# Patient Record
Sex: FEMALE | Race: WHITE | URBAN - METROPOLITAN AREA
[De-identification: names, ages, dates, MRNs, and addresses within clinical notes are randomized per-mention and may not be internally consistent; named-entity substitution may affect disease eponyms.]

---

## 2020-08-11 ENCOUNTER — OFFICE VISIT (OUTPATIENT)
Dept: CARDIOLOGY CLINIC | Age: 80
End: 2020-08-11
Payer: MEDICARE

## 2020-08-11 ENCOUNTER — TELEPHONE (OUTPATIENT)
Dept: CARDIOLOGY CLINIC | Age: 80
End: 2020-08-11

## 2020-08-11 VITALS
HEIGHT: 64 IN | WEIGHT: 168.8 LBS | SYSTOLIC BLOOD PRESSURE: 122 MMHG | DIASTOLIC BLOOD PRESSURE: 72 MMHG | HEART RATE: 91 BPM | BODY MASS INDEX: 28.82 KG/M2

## 2020-08-11 PROBLEM — I48.91 ATRIAL FIBRILLATION (HCC): Status: ACTIVE | Noted: 2020-08-11

## 2020-08-11 PROBLEM — R00.2 PALPITATIONS: Status: ACTIVE | Noted: 2020-08-11

## 2020-08-11 PROCEDURE — G8427 DOCREV CUR MEDS BY ELIG CLIN: HCPCS | Performed by: INTERNAL MEDICINE

## 2020-08-11 PROCEDURE — 1123F ACP DISCUSS/DSCN MKR DOCD: CPT | Performed by: INTERNAL MEDICINE

## 2020-08-11 PROCEDURE — 99204 OFFICE O/P NEW MOD 45 MIN: CPT | Performed by: INTERNAL MEDICINE

## 2020-08-11 PROCEDURE — 4040F PNEUMOC VAC/ADMIN/RCVD: CPT | Performed by: INTERNAL MEDICINE

## 2020-08-11 PROCEDURE — G8400 PT W/DXA NO RESULTS DOC: HCPCS | Performed by: INTERNAL MEDICINE

## 2020-08-11 PROCEDURE — 93000 ELECTROCARDIOGRAM COMPLETE: CPT | Performed by: INTERNAL MEDICINE

## 2020-08-11 PROCEDURE — 1036F TOBACCO NON-USER: CPT | Performed by: INTERNAL MEDICINE

## 2020-08-11 PROCEDURE — G8417 CALC BMI ABV UP PARAM F/U: HCPCS | Performed by: INTERNAL MEDICINE

## 2020-08-11 PROCEDURE — 1090F PRES/ABSN URINE INCON ASSESS: CPT | Performed by: INTERNAL MEDICINE

## 2020-08-11 RX ORDER — ATENOLOL 50 MG/1
75 TABLET ORAL 2 TIMES DAILY
COMMUNITY
Start: 2020-07-27 | End: 2021-09-10 | Stop reason: SDUPTHER

## 2020-08-11 RX ORDER — ASPIRIN 81 MG/1
81 TABLET, CHEWABLE ORAL DAILY
COMMUNITY
End: 2020-08-11

## 2020-08-11 RX ORDER — LOVASTATIN 20 MG/1
20 TABLET ORAL DAILY
COMMUNITY
Start: 2020-07-27 | End: 2022-03-16 | Stop reason: SDUPTHER

## 2020-08-11 NOTE — LETTER
Hernan Daniels  1940  [id number]    Have you had any Chest Pain - No      Have you had any Shortness of Breath - No      Have you had any dizziness - No      Have you had any palpitations - Yes  If Yes DO EKG - Do you feel your heart racing  How long does it last - minutes     Is the patient on any of the following medications - none  If Yes DO EKG    Do you have any edema -no    Do you have a surgery or procedure scheduled in the near future - no    Ask patient if they [facility] if they are not already signed [facility]    Check to see if we have an E-MAIL on file for the patient    Check medication list thoroughly!!!  BE SURE TO ASK PATIENT IF THEY NEED MEDICATION REFILLS

## 2020-08-11 NOTE — PATIENT INSTRUCTIONS
Newly recognized sustained A-fib. No significant symptoms. Will check Echo & stress test ( for rate control assessment ). Start Eliquis 5 mg BID. Arrange TE-Guided cardioversion. Stop ASA  Office F/U after the procedures. Please hold on to these instructions the  will call you within 1-9 business days when we receive authorization from your insurance. Echocardiogram    WHAT TO EXPECT:   ? This test will take approximately 45 minutes. ? It is an ultrasound of the heart. ? It can look at the valves and chambers inside the heart   ? There is no special instructions for this test.     If you are unable to keep this appointment, please notify us 24 hours prior to test at [phone]. Please hold on to these instructions the  will call you within 1-9 business days when we receive authorization from your insurance. Treadmill Stress test    WHAT TO EXPECT:     The exercise stress test is a test used to provide information about how the heart responds to exertion. It involves walking on a treadmill at increasing levels of difficulty, while electrocardiogram, heart rate, and blood pressure are monitored. ? This test will take approximately 1 hours: Please arrive at the office 5-10 min before the scheduled testing time. ? Once you are taken back to the stress lab you will be asked to read and sign a consent before proceeding with the test. At this time feel free to ask any question that you may have as the procedure is explained to you.   ? You will be attached to the EKG monitoring equipment, your blood pressure will be taken, and you will begin walking on the treadmill. The treadmill starts off slowly and every 3 minutes the treadmill speeds up and the elevation increases. The average person usually walks for a period of 6-8min. PREPARATION FOR TEST:    ? Eat a light meal such as juice and toast at least 2 hours prior to the procedure.    ? AVOID CAFFEINE 24 HOURS PRIOR TO THE TEST: Including coffee, Tea, Yesica and other soft drinks even those labeled  caffeine free or decaffeinated. ? Please wear loose comfortable clothing and comfortable walking shoes. Please wear a short sleeved shirt. ? Please shower or bath and do not apply powder or lotion to the skin prior to testing, as the electrodes will adhere better giving us a clearer visual EKG recording.    ? DO NOT TAKE BETA-BLOCKERS 24 HOURS PRIOR TO TESTING SUCH AS:\"Tenormin (atenolol)

## 2020-08-11 NOTE — PROGRESS NOTES
CARDIAC CONSULT NOTE       Reason for consultation: Yony Gentile is a 78 y.o. female who had concerns including Palpitations. .    Chief Complaint   Patient presents with    Palpitations       Referring physician:  No primary care provider on file. Primary care physician:  No primary care provider on file. History of Present Illness:   79 Y/O white female referred for evaluation, as she was noted to be in newly diagnosed atrial fibrillation. Other wise patient does not have any H/O significant medical problems. She hargrove have H/O SVT for which she has been treated with Atenolol & Dyslipidemia. No H/O Syncope, no C/O chest pain. Has HARGROVE with 2 flight of steps. She does not have H/O HTN,DM,CHF,CAD / PAD & previous stroke. But patient has been on Atenolol & HTN could be masked. That akes her CHADS score to be 3.     has no past medical history on file. H/O SVT & Dyslipidemia     has no past surgical history on file. reports that she has quit smoking. She has never used smokeless tobacco.     As Reviewed family history includes Arrhythmia in her maternal aunt, maternal grandmother, mother, and paternal aunt; Heart Surgery in her father. Review of Systems:   1. Constitutional: No Fever or Weight Loss  2. Eyes: No Decreased Vision  3. ENT: No Headaches, Hearing Loss or Vertigo  4. Cardiovascular: No chest pain, dyspnea on exertion, +palpitations no loss of consciousness  5. Respiratory: No cough or wheezing    6. Gastrointestinal: No abdominal pain, appetite loss, blood in stools, constipation, diarrhea or heartburn  7. Genitourinary: No dysuria, trouble voiding, or hematuria  8. Musculoskeletal:  No gait disturbance, weakness or joint complaints  9. Integumentary: No rash or pruritis  10. Neurological: No TIA or stroke symptoms  11. Psychiatric: No anxiety or depression  12. Endocrine: No malaise, fatigue or temperature intolerance  13.  Hematologic/Lymphatic: No bleeding problems, blood clots or swollen lymph nodes  14. Allergic/Immunologic: No nasal congestion or hives    Physical Examination:    /72   Pulse 91   Ht 5' 4\" (1.626 m)   Wt 168 lb 12.8 oz (76.6 kg)   BMI 28.97 kg/m²    Wt Readings from Last 3 Encounters:   08/11/20 168 lb 12.8 oz (76.6 kg)     Body mass index is 28.97 kg/m². General Appearance:  Non-obese/Well Nourished  1. Skin: It is warm & dry. No rashes noted. 2. Eyes: No conjunctival Pallor seen. No jaundice noted. 3. HEENT: atraumatic / normocephalic. EOMI. Neck is supple there is no elevation of JVD. No thyromegaly  4. Respiratory:  · Resp Assessment: Normal  · Resp Auscultation: Normal breath sounds without dullness  5. Cardiovascular:  · Auscultation: Normal  · Carotid Arteries: Normal  · Abdominal Aorta: Normal   · Pedal Pulses: 2+ and equal   6. Abdomen:  · No masses or tenderness  · Liver/Spleen: No Abnormalities Noted, no organomegaly. 7. Musculoskeletal: No joint deformities. No muscle wasting  8. Extremities:  ·  No Cyanosis or Clubbing. No significant edema   9. Rectal / genital: deferred. 10. Neurological/Psychiatric:  · Oriented to time, place, and person  · Non-anxious    No results found for: CKTOTAL, CKMB, CKMBINDEX, TROPONINT  BNP:  No results found for: BNP  PT/INR:  No results found for: PTINR  No results found for: LABA1C  No results found for: CHOL, TRIG, HDL, LDLCALC, LDLDIRECT, CHOLHDLRATIO  No results found for: ALT, AST  BMP:  No results found for: NA, K, CL, CO2, BUN  CMP:  No results found for: NA, K, CL, CO2, BUN, PROT, ALB  TSH:  No results found for: TSH, TSHHS    Echo: not done  Stress Cardiolyte: not done  EKG: atrial fibrillation, rate 91    Assessment:   Patient Active Problem List   Diagnosis Code    Palpitations R00.2    [facility] I48.91       QUALITY MEASURES REVIEWED:  1.CAD:Patient is taking anti platelet agent:Yes  2. DYSLIPIDEMIA: Patient is on cholesterol lowering medication:Yes  3. Beta-Blocker therapy for CAD, if prior Myocardial Infarction:Yes  4. Atrial fibrillation & anticoagulation therapy No  5. Discussed weight management strategies. Recommendations:   Newly recognized sustained A-fib. No significant symptoms. Will check Echo & stress test ( for rate control assessment ). Start Eliquis 5 mg BID. Arrange TE-Guided cardioversion. Stop ASA  Office F/U after the procedures.        Leona Moncada MD, 8/11/2020 3:21 PM

## 2020-08-11 NOTE — TELEPHONE ENCOUNTER
Pt here in office & educated on MARIAN/Cardioversion for Dx: AFIB, scheduled for TBD @ TBD, w/arrival @ TBD, @ [facility]; risks explained; & consents signed. Pre-admission orders given to pt. COVID testing @ [facility] @ [facility] Instructions given to pt to:  NPO after midnight night before procedure; Call hospital @ [phone] to pre-register; May take rest of morning meds. am of procedure; & pt voiced understanding. Copies of consent, pre-testing orders, & info. sheet scanned into chart. Patient scheduled for echo and stress test.  Patient to call in 2-3 weeks with decision of procedure as she needs to discuss with family and think over.

## 2020-08-12 ENCOUNTER — TELEPHONE (OUTPATIENT)
Dept: CARDIOLOGY CLINIC | Age: 80
End: 2020-08-12

## 2020-08-12 NOTE — TELEPHONE ENCOUNTER
Pt called to cx treadmill for today at 4 pm. Did not want to reschedule at this time. was not sent to

## 2020-08-19 ENCOUNTER — TELEPHONE (OUTPATIENT)
Dept: CARDIOLOGY CLINIC | Age: 80
End: 2020-08-19

## 2020-08-20 NOTE — TELEPHONE ENCOUNTER
Patient advised that she may qualify for Patient assistance through [employer] as she has no Medicare part D coverage. Patient assistance forms placed at the  with 30 day free card. She voiced understanding.

## 2020-08-26 ENCOUNTER — PROCEDURE VISIT (OUTPATIENT)
Dept: CARDIOLOGY CLINIC | Age: 80
End: 2020-08-26
Payer: MEDICARE

## 2020-08-26 LAB
LV EF: 58 %
LVEF MODALITY: NORMAL

## 2020-08-26 PROCEDURE — 93015 CV STRESS TEST SUPVJ I&R: CPT | Performed by: INTERNAL MEDICINE

## 2020-08-26 PROCEDURE — 93306 TTE W/DOPPLER COMPLETE: CPT | Performed by: INTERNAL MEDICINE

## 2020-09-01 ENCOUNTER — TELEPHONE (OUTPATIENT)
Dept: CARDIOLOGY CLINIC | Age: 80
End: 2020-09-01

## 2020-09-01 NOTE — TELEPHONE ENCOUNTER
Patient returned call for results and voiced understanding. Patient is scheduled for f/u OV with Alexis on 9/22 when she would like to discuss the MARIAN/Cardioversion procedure. She was originally here on 8/11 and refused to schedule and states she has discussed with her family but is not ready to schedule the procedure. Patient states maybe after the office visit and talking with Dr. Helena Carrington again she may be ready.

## 2020-09-01 NOTE — TELEPHONE ENCOUNTER
Called patient to advise of results. LM on  for patient to return call. Patient was seen on 8/11 and did not want to scheduled for MARIAN/Cardioversion. She cancelled GXT the first time. Patient was wanting to discuss procedure with family before making decision. Need to know if procedure needs scheduled. She has f/u OV with Alexis on 9/22. Conclusion:Fair exercise tolerance. 5 METs work load. Excessive HR response to exertion suggests A-fib HR is not well controlled. Physiological BP response to exercise. ETT non diagnostic due to baseline abnormalities. Left ventricular function is normal, EF is estimated at 55-60%. Diastolic dysfunction could not be evaluated due to arrhythmia. No regional wall motion abnormalities were detected. Bi atrial enlargemnt. Mitral annular calcification is present. Moderate mitral, tricuspid and mild aortic regurgitation is present. No evidence of pericardial effusion.

## 2020-09-22 ENCOUNTER — OFFICE VISIT (OUTPATIENT)
Dept: CARDIOLOGY CLINIC | Age: 80
End: 2020-09-22
Payer: MEDICARE

## 2020-09-22 VITALS
TEMPERATURE: 97 F | SYSTOLIC BLOOD PRESSURE: 120 MMHG | HEART RATE: 84 BPM | HEIGHT: 64 IN | BODY MASS INDEX: 27.86 KG/M2 | DIASTOLIC BLOOD PRESSURE: 86 MMHG | WEIGHT: 163.2 LBS

## 2020-09-22 PROBLEM — E78.5 DYSLIPIDEMIA: Status: ACTIVE | Noted: 2020-09-22

## 2020-09-22 PROBLEM — I10 ESSENTIAL HYPERTENSION: Status: ACTIVE | Noted: 2020-09-22

## 2020-09-22 PROBLEM — I38 VHD (VALVULAR HEART DISEASE): Status: ACTIVE | Noted: 2020-09-22

## 2020-09-22 PROCEDURE — 1036F TOBACCO NON-USER: CPT | Performed by: INTERNAL MEDICINE

## 2020-09-22 PROCEDURE — 4040F PNEUMOC VAC/ADMIN/RCVD: CPT | Performed by: INTERNAL MEDICINE

## 2020-09-22 PROCEDURE — G8417 CALC BMI ABV UP PARAM F/U: HCPCS | Performed by: INTERNAL MEDICINE

## 2020-09-22 PROCEDURE — 99213 OFFICE O/P EST LOW 20 MIN: CPT | Performed by: INTERNAL MEDICINE

## 2020-09-22 PROCEDURE — G8427 DOCREV CUR MEDS BY ELIG CLIN: HCPCS | Performed by: INTERNAL MEDICINE

## 2020-09-22 PROCEDURE — 1123F ACP DISCUSS/DSCN MKR DOCD: CPT | Performed by: INTERNAL MEDICINE

## 2020-09-22 PROCEDURE — 1090F PRES/ABSN URINE INCON ASSESS: CPT | Performed by: INTERNAL MEDICINE

## 2020-09-22 PROCEDURE — G8400 PT W/DXA NO RESULTS DOC: HCPCS | Performed by: INTERNAL MEDICINE

## 2020-09-22 RX ORDER — ATENOLOL 25 MG/1
25 TABLET ORAL 2 TIMES DAILY
Qty: 180 TABLET | Refills: 3 | Status: SHIPPED | OUTPATIENT
Start: 2020-09-22 | End: 2021-09-14 | Stop reason: SDUPTHER

## 2020-09-22 RX ORDER — ATENOLOL 50 MG/1
50 TABLET ORAL 2 TIMES DAILY
Qty: 180 TABLET | Refills: 3 | Status: SHIPPED | OUTPATIENT
Start: 2020-09-22 | End: 2021-03-15

## 2020-09-22 NOTE — PROGRESS NOTES
[id number] Score for Atrial Fibrillation Stroke Risk   Risk   Factors  Component Value   C CHF No 0   H HTN No 0   A2 Age >= 76 Yes,  [de-identified] y.o.) 2   D DM No 0   S2 Prior Stroke/TIA No 0   V Vascular Disease No 0   A Age 74-69 No,  [de-identified] y.o.) 0   Sc Sex female 1    [id number]  Score  3   Score last updated 9/22/20 2:98 PM EDT    Click here for a link to the UpToDate guideline \"Atrial Fibrillation: Anticoagulation therapy to prevent embolization    Disclaimer: Risk Score calculation is dependent on accuracy of patient problem list and past encounter diagnosis.

## 2020-09-22 NOTE — PROGRESS NOTES
OFFICE PROGRESS NOTES      Arturo Womack is a [de-identified] y.o. female who has    CHIEF COMPLAINT AS FOLLOWS:  CHEST PAIN: Patient denies any C/O chest pains at this time. SOB: No C/O SOB at this time. LEG EDEMA: No leg edema   PALPITATIONS: Denies any C/O Palpitations   DIZZINESS: No C/O Dizziness   SYNCOPE: None   OTHER:                                     HPI: Patient is here for F/U on her A-fib, ? HTN & Dyslipidemia problems. She does not have any complaints at this time. Reynaldo Nickerson has the following history recorded in care path:  Patient Active Problem List    Diagnosis Date Noted    Palpitations 08/11/2020    Atrial fibrillation ([address] 08/11/2020     Current Outpatient Medications   Medication Sig Dispense Refill    atenolol (TENORMIN) 25 MG tablet Take 1 tablet by mouth 2 times daily 180 tablet 3    atenolol (TENORMIN) 50 MG tablet Take 1 tablet by mouth 2 times daily 180 tablet 3    atenolol (TENORMIN) 50 MG tablet Take 75 mg by mouth 2 times daily       lovastatin (MEVACOR) 20 MG tablet Take 20 mg by mouth daily      apixaban (ELIQUIS) 5 MG TABS tablet Take 1 tablet by mouth 2 times daily 60 tablet 5     No current facility-administered medications for this visit. Allergies: Penicillins  Past Medical History:   Diagnosis Date    History of echocardiogram 08/26/2020    EF 33-52%, Diastolic dysfunction not evaluated due to arrhythmia, no regional wall motion abnormalities, bi atrial enlargement, mitral annular calcification present, Mod MR, TR, Mild AR, No pericardial effusion     History of exercise stress test 08/26/2020    Treadmill, Excessive HR response to exertion suggests A-fib HR is not well controlled. Physiological BP response to exercise. ETT non diagnostic due to baseline abnormalities. History reviewed. No pertinent surgical history.    As reviewed   Family History   Problem Relation Age of Onset    Cardiovascular - Normal S1 and S2 without obvious murmur or gallop. Extremities - No cyanosis, clubbing, or significant edema. Pulmonary - No respiratory distress. No wheezes or rales. Abdomen - No masses, tenderness, or organomegaly. Musculoskeletal - No significant edema. No joint deformities. No muscle wasting. Neurologic - Cranial nerves II through XII are grossly intact. There were no gross focal neurologic abnormalities. Lab Review   No results found for: CKTOTAL, CKMB, CKMBINDEX, TROPONINT  BNP:  No results found for: BNP  PT/INR:  No results found for: INR  No results found for: LABA1C  No results found for: WBC, HCT, MCV, PLT  No results found for: CHOL, TRIG, HDL, LDLCALC, LDLDIRECT, CHOLHDLRATIO  No results found for: ALT, AST  BMP:  No results found for: NA, K, CL, CO2, BUN, CREATININE  CMP: No results found for: NA, K, CL, CO2, BUN, PROT, ALB  TSH:  No results found for: TSH, TSHHS    QUALITY MEASURES REVIEWED:  1.CAD:Patient is taking anti platelet agent:No  2. DYSLIPIDEMIA: Patient is on cholesterol lowering medication:Yes  3. Beta-Blocker therapy for CAD, if prior Myocardial Infarction:Yes  4. Atrial fibrillation & anticoagulation therapy Yes  5. Discussed weight management strategies. Impression:    No diagnosis found. Patient Active Problem List   Diagnosis Code    Palpitations R00.2    Atrial fibrillation (H[facility]) I48.91     [id number] Score for Atrial Fibrillation Stroke Risk   Risk   Factors  Component Value   C CHF No 0   H HTN Yes 1   A2 Age >= 76 Yes,  [de-identified] y.o.) 2   D DM No 0   S2 Prior Stroke/TIA No 0   V Vascular Disease No 0   A Age 74-69 No,  [de-identified] y.o.) 0   Sc Sex female 1    [id number]  Score  4   Score last updated 9/22/20 4:47 PM EDT    Assessment & Plan:    CAD:No  HTN:well controlled on current medical regimen, see list above.               - changes in  treatment:   no   CARDIOMYOPATHY: None known   CONGESTIVE HEART FAILURE: NO KNOWN HISTORY.      VHD: Moderate MR / TR  DYSLIPIDEMIA: Patient's profile is not available. ARRHYTHMIAS: known                                Patient has H/O Atrial fibrillation                                She is rate controlled & on anticoagulation. OTHER RELEVANT DIAGNOSIS:as noted in patient's active problem list:  TESTS ORDERED: None this visit. Patient still wants to think about cardioversion. All previously ordered tests reviewed. MEDICATIONS: CPM for now. Office f/u in one month after cardioversion. Primary/secondary prevention is the goal by aggressive risk modification, healthy and therapeutic life style changes for cardiovascular risk reduction. Various goals are discussed and multiple questions answered.

## 2020-09-22 NOTE — PATIENT INSTRUCTIONS
CAD:No  HTN:well controlled on current medical regimen, see list above. - changes in  treatment:   no   CARDIOMYOPATHY: None known   CONGESTIVE HEART FAILURE: NO KNOWN HISTORY.      VHD: Moderate MR / TR  DYSLIPIDEMIA: Patient's profile is not available. ARRHYTHMIAS: known                                Patient has H/O Atrial fibrillation                                She is rate controlled & on anticoagulation. OTHER RELEVANT DIAGNOSIS:as noted in patient's active problem list:  TESTS ORDERED: None this visit. Patient still wants to think about cardioversion. All previously ordered tests reviewed. MEDICATIONS: CPM for now. Office f/u in one month after cardioversion. Primary/secondary prevention is the goal by aggressive risk modification, healthy and therapeutic life style changes for cardiovascular risk reduction. Various goals are discussed and multiple questions answered.

## 2021-03-15 ENCOUNTER — OFFICE VISIT (OUTPATIENT)
Dept: CARDIOLOGY CLINIC | Age: 81
End: 2021-03-15
Payer: MEDICARE

## 2021-03-15 VITALS
HEIGHT: 64 IN | DIASTOLIC BLOOD PRESSURE: 80 MMHG | HEART RATE: 84 BPM | WEIGHT: 169.8 LBS | BODY MASS INDEX: 28.99 KG/M2 | SYSTOLIC BLOOD PRESSURE: 128 MMHG

## 2021-03-15 DIAGNOSIS — I38 VHD (VALVULAR HEART DISEASE): ICD-10-CM

## 2021-03-15 DIAGNOSIS — I10 ESSENTIAL HYPERTENSION: ICD-10-CM

## 2021-03-15 DIAGNOSIS — I48.0 PAROXYSMAL ATRIAL FIBRILLATION (HCC): Primary | ICD-10-CM

## 2021-03-15 DIAGNOSIS — E78.5 DYSLIPIDEMIA: ICD-10-CM

## 2021-03-15 PROCEDURE — 99214 OFFICE O/P EST MOD 30 MIN: CPT | Performed by: INTERNAL MEDICINE

## 2021-03-15 NOTE — LETTER
[address]  [address] [id number]  Phone: [phone]  Fax: [phone]    Darryn Haro MD        March 15, 2021     Blaine Quinn, [address] [place] [address]    Patient: Cassie South  MR Number: [id number]  YOB: 1940  Date of Visit: 3/15/2021    Dear Dr. Blaine Quinn:    Thank you for the request for consultation for Cassie South to me for the evaluation of VHD/A-Fib. Below are the relevant portions of my assessment and plan of care. If you have questions, please do not hesitate to call me. I look forward to following [place] along with you.     Sincerely,        Darryn Haro MD

## 2021-03-15 NOTE — PROGRESS NOTES
OFFICE PROGRESS NOTES      Thompson Ann is a [de-identified] y.o. female who has    CHIEF COMPLAINT AS FOLLOWS:  CHEST PAIN: Patient denies any C/O chest pains at this time. SOB: No C/O SOB at this time. LEG EDEMA: No leg edema   PALPITATIONS: Denies any C/O Palpitations   DIZZINESS: No C/O Dizziness   SYNCOPE: None   OTHER:                                     HPI: Patient is here for F/U on his A-fib- Arrhythmia, ? HTN & Dyslipidemia problems. Arrhythmia: Patient has known Hx of Supraventricular / Ventricular arrhythmia in the past.  HTN: Patient has known Hx of essential HTN. Has been treated with guideline recommended medical / physical/ diet therapy as stated below. He does not have any new complaints at this time. Current Outpatient Medications   Medication Sig Dispense Refill    apixaban (ELIQUIS) 5 MG TABS tablet Take 1 tablet by mouth 2 times daily 60 tablet 5    atenolol (TENORMIN) 25 MG tablet Take 1 tablet by mouth 2 times daily 180 tablet 3    atenolol (TENORMIN) 50 MG tablet Take 75 mg by mouth 2 times daily       lovastatin (MEVACOR) 20 MG tablet Take 20 mg by mouth daily       No current facility-administered medications for this visit. Allergies: Penicillins  Review of Systems:    Constitutional: Negative for diaphoresis and fatigue  Respiratory: Negative for shortness of breath  Cardiovascular: Negative for chest pain, dyspnea on exertion, claudication, edema, irregular heartbeat, murmur, palpitations or shortness of breath  Musculoskeletal: Negative for muscle pain, muscular weakness, negative for pain in arm and leg or swelling in foot and leg    Objective:  /80   Pulse 84   Ht 5' 4\" (1.626 m)   Wt 169 lb 12.8 oz (77 kg)   BMI 29.15 kg/m²   Wt Readings from Last 3 Encounters:   03/15/21 169 lb 12.8 oz (77 kg)   09/22/20 163 lb 3.2 oz (74 kg)   08/11/20 168 lb 12.8 oz (76.6 kg)     Body mass index is 29.15 kg/m².   GENERAL - Alert, oriented, pleasant, in no apparent distress. EYES: No jaundice, no conjunctival pallor. Neck - Supple. No jugular venous distention noted. No carotid bruits. Cardiovascular - Normal S1 and S2 without obvious murmur or gallop. Extremities - No cyanosis, clubbing, or significant edema. Pulmonary - No respiratory distress. No wheezes or rales. Lab Review   No results found for: TROPONINT  No results found for: BNP, PROBNP  No results found for: INR  No results found for: LABA1C  No results found for: WBC, HCT, MCV, PLT  No results found for: CHOL, TRIG, HDL, LDLCALC, LDLDIRECT, CHOLHDLRATIO  No results found for: ALT, AST  BMP:  No results found for: NA, K, CL, CO2, BUN, CREATININE  CMP: No results found for: NA, K, CL, CO2, BUN, CREATININE, PROT, ALB  No results found for: TSH, TSHHS    ECHO 8/2020   Left ventricular function is normal, EF is estimated at 55-60%. Diastolic dysfunction could not be evaluated due to arrhythmia. No regional wall motion abnormalities were detected. Bi atrial enlargemnt. Mitral annular calcification is present. Moderate mitral, tricuspid and mild aortic regurgitation is present. No evidence of pericardial effusion. STRESS TEST: 8/2020   Conclusion:Fair exercise tolerance. 5 METs work load. Excessive HR response to exertion suggests A-fib HR is not well controlled. Physiological BP response to exercise. ETT non diagnostic due to baseline abnormalities. QUALITY MEASURES REVIEWED:  1.CAD:Patient is taking anti platelet agent:No  Patient does not have Hx of documented CAD  2. DYSLIPIDEMIA: Patient is on cholesterol lowering medication:Yes . 3.Beta-Blocker therapy for CAD, if prior Myocardial Infarction:Yes   4. Counselled regarding smoking cessation. No   Patient does not Smoke. 5.Anticoagulation therapy (for A.Fib) Yes   Does Not have A.Fib.  6.Discussed weight management strategies.       Assessment & Plan:    Primary / Secondary prevention is the goal by aggressive risk modification, healthy and therapeutic life style changes for cardiovascular risk reduction. Various goals are discussed and multiple questions answered. CAD:None known  HTN:well controlled on current medical regimen, see list above. - changes in  treatment:   no   CARDIOMYOPATHY: None known   CONGESTIVE HEART FAILURE: NO KNOWN HISTORY.      VHD: Moderate MR / TR  DYSLIPIDEMIA: Patient's profile is not available. ARRHYTHMIAS: known                                Patient has H/O Atrial fibrillation                                She is rate controlled & on anticoagulation. OTHER RELEVANT DIAGNOSIS:as noted in patient's active problem list:  TESTS ORDERED: None this visit. Patient still wants to think about cardioversion. All previously ordered tests reviewed. MEDICATIONS: CPM   Office f/u in one year.

## 2021-03-15 NOTE — PATIENT INSTRUCTIONS
CAD:None known  HTN:well controlled on current medical regimen, see list above. - changes in  treatment:   no   CARDIOMYOPATHY: None known   CONGESTIVE HEART FAILURE: NO KNOWN HISTORY.      VHD: Moderate MR / TR  DYSLIPIDEMIA: Patient's profile is not available. ARRHYTHMIAS: known                                Patient has H/O Atrial fibrillation                                She is rate controlled & on anticoagulation. OTHER RELEVANT DIAGNOSIS:as noted in patient's active problem list:  TESTS ORDERED: None this visit. Patient still wants to think about cardioversion. All previously ordered tests reviewed. MEDICATIONS: CPM   Office f/u in one year.

## 2021-03-15 NOTE — PROGRESS NOTES
[id number] Score for Atrial Fibrillation Stroke Risk   Risk   Factors  Component Value   C CHF No 0   H HTN Yes 1   A2 Age >= 76 Yes,  [de-identified] y.o.) 2   D DM No 0   S2 Prior Stroke/TIA No 0   V Vascular Disease No 0   A Age 74-69 No,  [de-identified] y.o.) 0   Sc Sex female 1    [id number]  Score  4   Score last updated 3/15/21 6:48 PM EDT    Click here for a link to the UpToDate guideline \"Atrial Fibrillation: Anticoagulation therapy to prevent embolization    Disclaimer: Risk Score calculation is dependent on accuracy of patient problem list and past encounter diagnosis.

## 2021-09-14 RX ORDER — ATENOLOL 50 MG/1
TABLET ORAL
Qty: 180 TABLET | OUTPATIENT
Start: 2021-09-14

## 2021-09-14 RX ORDER — ATENOLOL 25 MG/1
TABLET ORAL
Qty: 180 TABLET | Refills: 3 | OUTPATIENT
Start: 2021-09-14

## 2021-09-14 RX ORDER — ATENOLOL 50 MG/1
75 TABLET ORAL 2 TIMES DAILY
Qty: 90 TABLET | Refills: 1 | Status: SHIPPED | OUTPATIENT
Start: 2021-09-14 | End: 2021-12-28 | Stop reason: SDUPTHER

## 2021-09-14 RX ORDER — ATENOLOL 25 MG/1
25 TABLET ORAL 2 TIMES DAILY
Qty: 180 TABLET | Refills: 3 | Status: SHIPPED | OUTPATIENT
Start: 2021-09-14 | End: 2022-03-16 | Stop reason: SDUPTHER

## 2021-12-28 RX ORDER — ATENOLOL 50 MG/1
TABLET ORAL
Qty: 90 TABLET | Refills: 1 | Status: SHIPPED | OUTPATIENT
Start: 2021-12-28 | End: 2022-03-16 | Stop reason: SDUPTHER

## 2021-12-28 NOTE — TELEPHONE ENCOUNTER
Patient called asking  Refill for 50 mg she takes 2 times a day not 1/2 tablet its to hard to spit   She also takes a 25 mg as well  90 days appt 3/22

## 2022-03-16 ENCOUNTER — OFFICE VISIT (OUTPATIENT)
Dept: CARDIOLOGY CLINIC | Age: 82
End: 2022-03-16
Payer: MEDICARE

## 2022-03-16 VITALS
BODY MASS INDEX: 29.02 KG/M2 | WEIGHT: 170 LBS | SYSTOLIC BLOOD PRESSURE: 120 MMHG | HEART RATE: 75 BPM | DIASTOLIC BLOOD PRESSURE: 72 MMHG | HEIGHT: 64 IN

## 2022-03-16 DIAGNOSIS — I48.0 PAROXYSMAL ATRIAL FIBRILLATION (HCC): Primary | ICD-10-CM

## 2022-03-16 DIAGNOSIS — I10 ESSENTIAL HYPERTENSION: ICD-10-CM

## 2022-03-16 DIAGNOSIS — E78.5 DYSLIPIDEMIA: ICD-10-CM

## 2022-03-16 DIAGNOSIS — I38 VHD (VALVULAR HEART DISEASE): ICD-10-CM

## 2022-03-16 PROCEDURE — 99213 OFFICE O/P EST LOW 20 MIN: CPT | Performed by: INTERNAL MEDICINE

## 2022-03-16 RX ORDER — ATENOLOL 25 MG/1
25 TABLET ORAL 2 TIMES DAILY
Qty: 180 TABLET | Refills: 3 | Status: SHIPPED | OUTPATIENT
Start: 2022-03-16

## 2022-03-16 RX ORDER — ATENOLOL 50 MG/1
TABLET ORAL
Qty: 180 TABLET | Refills: 3 | Status: SHIPPED | OUTPATIENT
Start: 2022-03-16

## 2022-03-16 RX ORDER — LOVASTATIN 20 MG/1
20 TABLET ORAL DAILY
Qty: 90 TABLET | Refills: 3 | Status: SHIPPED | OUTPATIENT
Start: 2022-03-16

## 2022-03-16 NOTE — PATIENT INSTRUCTIONS
CORONARY ARTERY DISEASE:None known  8/2020   Conclusion:Fair exercise tolerance. 5 METs work load. Excessive HR response to exertion suggests A-fib HR is not well controlled. Physiological BP response to exercise. ETT non diagnostic due to baseline abnormalities. HTN:well controlled on current medical regimen, see list above.              - changes in  treatment:   no, on Atenolol   CARDIOMYOPATHY: None known   CONGESTIVE HEART FAILURE: NO KNOWN HISTORY.      VHD: Moderate MR / TR  ECHO 8/2020   Left ventricular function is normal, EF is estimated at 55-60%. Diastolic dysfunction could not be evaluated due to arrhythmia. No regional wall motion abnormalities were detected. Bi atrial enlargemnt. Mitral annular calcification is present. Moderate mitral, tricuspid and mild aortic regurgitation is present. No evidence of pericardial effusion. DYSLIPIDEMIA: Patient's profile is not available. Patient is on Mevacor  ARRHYTHMIAS: known                                Patient has H/O Atrial fibrillation                                She is rate controlled & on anticoagulation. takes Eliquis & atenolol  TESTS ORDERED:     PREVIOUSLY ORDERED TESTS REVIEWED & DISCUSSED WITH THE PATIENT:     I personally reviewed & interpreted, all previously ordered tests as copied above. Latest Labs are pulled in to the note with dates. Labs, specially in Reference to Lipid profile, Cardiac testing in the form of Echo ( dated: ), stress tests ( dated: ) & other relevant cardiac testing reviewed with patient & recommendations made based on assessment of the results. Discussed role of Cardiac risk factors & effects + treatment of co morbidities with patient & advised accordingly. MEDICATIONS: List of medications patient is currently taking is reviewed in detail with the patient. Discussed any side effects or problems taking the medication. Recommend Continue present management & medications as listed. AFFIRMATION: I spent at least 20 minutes of time reviewing patient's history, previous & current medical problems & all Labs + testing. This includes chart prep even prior to the vosit. Various goals are discussed and multiple questions answered. Relevant concelling performed. Office follow up in one year.

## 2022-03-16 NOTE — LETTER
[facility]  [address] [place] [address]  Phone: [phone]  Fax: [phone]    Janeen Jacques MD    March 16, 2022     Debi Johnson, [address] [place] [address]    Patient: Zuleima Dwyer   MR Number: [id number]   YOB: 1940   Date of Visit: 3/16/2022       Dear Debi Johnson:    Thank you for referring Zuleima Dwyer to me for evaluation/treatment. Below are the relevant portions of my assessment and plan of care. If you have questions, please do not hesitate to call me. I look forward to following Christopher Welch along with you.     Sincerely,      Janeen Jacques MD

## 2022-03-16 NOTE — PROGRESS NOTES
OFFICE PROGRESS NOTES      Denis Villafuerte is a 80 y.o. female who has    CHIEF COMPLAINT AS FOLLOWS:  CHEST PAIN: Patient denies any C/O chest pains at this time.      SOB: No C/O SOB at this time.               LEG EDEMA: No leg edema   PALPITATIONS: Denies any C/O Palpitations   DIZZINESS: No C/O Dizziness   SYNCOPE: None   OTHER/ ADDITIONAL COMPLAINTS:                                     HPI: Patient is here for F/U on her A-fib Arrhythmia, HTN & Dyslipidemia problems. Arrhythmia: Patient has known A-fib. HTN: Patient has known essential HTN. Has been treated with guideline recommended medical / physical/ diet therapy as stated below. Dyslipidemia: Patient has known mixed dyslipidemia. Has been treated with guideline recommended medical / physical/ diet therapy as stated below. Current Outpatient Medications   Medication Sig Dispense Refill    apixaban (ELIQUIS) 5 MG TABS tablet Take 1 tablet by mouth 2 times daily 180 tablet 3    atenolol (TENORMIN) 25 MG tablet Take 1 tablet by mouth 2 times daily 180 tablet 3    atenolol (TENORMIN) 50 MG tablet Take one tablet  tablet 3    lovastatin (MEVACOR) 20 MG tablet Take 1 tablet by mouth daily 90 tablet 3     No current facility-administered medications for this visit.      Allergies: Penicillins  Review of Systems:    Constitutional: Negative for diaphoresis and fatigue  Respiratory: Negative for shortness of breath  Cardiovascular: Negative for chest pain, dyspnea on exertion, claudication, edema, irregular heartbeat, murmur, palpitations or shortness of breath  Musculoskeletal: Negative for muscle pain, muscular weakness, negative for pain in arm and leg or swelling in foot and leg    Objective:  /72   Pulse 75   Ht 5' 4\" (1.626 m)   Wt 170 lb (77.1 kg)   BMI 29.18 kg/m²   Wt Readings from Last 3 Encounters:   03/16/22 170 lb (77.1 kg)   03/15/21 169 lb 12.8 oz (77 kg)   09/22/20 163 lb 3.2 oz (74 kg)     Body mass index is 29.18 kg/m².  GENERAL - Alert, oriented, pleasant, in no apparent distress. EYES: No jaundice, no conjunctival pallor. Neck - Supple. No jugular venous distention noted. No carotid bruits. Cardiovascular - Normal S1 and S2 without obvious murmur or gallop. Extremities - No cyanosis, clubbing, or significant edema. Pulmonary - No respiratory distress. No wheezes or rales. MEDICAL DECISION MAKING & DATA REVIEW:    Lab Review   No results found for: TROPONINT  No results found for: BNP, PROBNP  No results found for: INR  No results found for: LABA1C  No results found for: WBC, HCT, MCV, PLT  No results found for: CHOL, TRIG, HDL, LDLCALC, LDLDIRECT, CHOLHDLRATIO  No results found for: ALT, AST  BMP:  No results found for: NA, K, CL, CO2, BUN, CREATININE, GLU  CMP: No results found for: NA, K, CL, CO2, BUN, CREATININE, GLU, PROT, ALB, CA  No results found for: TSH, TSHHS    QUALITY MEASURES REVIEWED:  1.CAD:Patient is taking anti platelet agent:No  Patient does not have Hx of documented CAD  2. DYSLIPIDEMIA: Patient is on cholesterol lowering medication:Yes   3. Beta-Blocker therapy for CAD, if prior Myocardial Infarction:Yes   4. Counselled regarding smoking cessation. No   Patient does not Smoke. 5.Anticoagulation therapy (for A.Fib) Yes  6. Discussed weight management strategies. Assessment & Plan:  Primary / Secondary prevention is the goal by aggressive risk modification, healthy and therapeutic life style changes for cardiovascular risk reduction. CORONARY ARTERY DISEASE:None known  8/2020   Conclusion:Fair exercise tolerance. 5 METs work load. Excessive HR response to exertion suggests A-fib HR is not well controlled. Physiological BP response to exercise. ETT non diagnostic due to baseline abnormalities.   HTN:well controlled on current medical regimen, see list above.              - changes in  treatment:   no, on Atenolol   CARDIOMYOPATHY: None known   CONGESTIVE HEART FAILURE: NO KNOWN HISTORY.      VHD: Moderate MR / TR  ECHO 8/2020   Left ventricular function is normal, EF is estimated at 55-60%. Diastolic dysfunction could not be evaluated due to arrhythmia. No regional wall motion abnormalities were detected. Bi atrial enlargemnt. Mitral annular calcification is present. Moderate mitral, tricuspid and mild aortic regurgitation is present. No evidence of pericardial effusion. DYSLIPIDEMIA: Patient's profile is not available. Patient is on Mevacor  ARRHYTHMIAS: known                                Patient has H/O Atrial fibrillation                                She is rate controlled & on anticoagulation. takes Eliquis & atenolol  TESTS ORDERED:     PREVIOUSLY ORDERED TESTS REVIEWED & DISCUSSED WITH THE PATIENT:     I personally reviewed & interpreted, all previously ordered tests as copied above. Latest Labs are pulled in to the note with dates. Labs, specially in Reference to Lipid profile, Cardiac testing in the form of Echo ( dated: ), stress tests ( dated: ) & other relevant cardiac testing reviewed with patient & recommendations made based on assessment of the results. Discussed role of Cardiac risk factors & effects + treatment of co morbidities with patient & advised accordingly. MEDICATIONS: List of medications patient is currently taking is reviewed in detail with the patient. Discussed any side effects or problems taking the medication. Recommend Continue present management & medications as listed. AFFIRMATION: I spent at least 20 minutes of time reviewing patient's history, previous & current medical problems & all Labs + testing. This includes chart prep even prior to the vosit. Various goals are discussed and multiple questions answered. Relevant concelling performed. Office follow up in one year.

## 2022-03-31 LAB
ALBUMIN SERPL-MCNC: 4.8 G/DL
ALP BLD-CCNC: 91 U/L
ALT SERPL-CCNC: 25 U/L
ANION GAP SERPL CALCULATED.3IONS-SCNC: NORMAL MMOL/L
AST SERPL-CCNC: 23 U/L
AVERAGE GLUCOSE: NORMAL
BASOPHILS ABSOLUTE: 0.1 /ΜL
BASOPHILS RELATIVE PERCENT: 1.2 %
BILIRUB SERPL-MCNC: 0.5 MG/DL (ref 0.1–1.4)
BUN BLDV-MCNC: 15 MG/DL
CALCIUM SERPL-MCNC: 9.3 MG/DL
CHLORIDE BLD-SCNC: 106 MMOL/L
CHOLESTEROL, TOTAL: 157 MG/DL
CHOLESTEROL/HDL RATIO: NORMAL
CO2: 25 MMOL/L
CREAT SERPL-MCNC: 1.2 MG/DL
EOSINOPHILS ABSOLUTE: 0.4 /ΜL
EOSINOPHILS RELATIVE PERCENT: 6.8 %
GFR CALCULATED: NORMAL
GLUCOSE BLD-MCNC: 99 MG/DL
HBA1C MFR BLD: 6.1 %
HCT VFR BLD CALC: 31.6 % (ref 36–46)
HDLC SERPL-MCNC: 61 MG/DL (ref 35–70)
HEMOGLOBIN: 9.8 G/DL (ref 12–16)
LDL CHOLESTEROL CALCULATED: 83 MG/DL (ref 0–160)
LYMPHOCYTES ABSOLUTE: 2.3 /ΜL
LYMPHOCYTES RELATIVE PERCENT: 39.9 %
MCH RBC QN AUTO: 24.5 PG
MCHC RBC AUTO-ENTMCNC: 31 G/DL
MCV RBC AUTO: 79 FL
MONOCYTES ABSOLUTE: 0.5 /ΜL
MONOCYTES RELATIVE PERCENT: 7.9 %
NEUTROPHILS ABSOLUTE: 2.5 /ΜL
NEUTROPHILS RELATIVE PERCENT: 43.9 %
NONHDLC SERPL-MCNC: NORMAL MG/DL
PLATELET # BLD: 259 K/ΜL
PMV BLD AUTO: ABNORMAL FL
POTASSIUM SERPL-SCNC: 4.4 MMOL/L
RBC # BLD: 4 10^6/ΜL
SODIUM BLD-SCNC: 142 MMOL/L
TOTAL PROTEIN: 6.4
TRIGL SERPL-MCNC: 66 MG/DL
TSH SERPL DL<=0.05 MIU/L-ACNC: 2.1 UIU/ML
VLDLC SERPL CALC-MCNC: 13 MG/DL
WBC # BLD: 5.7 10^3/ML

## 2022-07-26 ENCOUNTER — TELEPHONE (OUTPATIENT)
Dept: CARDIOLOGY CLINIC | Age: 82
End: 2022-07-26

## 2022-07-26 NOTE — TELEPHONE ENCOUNTER
Alexis / patient needs clearance for Alfonzo Almonte she is having EGD C Scope   Thursday .  Office to send request

## 2022-07-27 ENCOUNTER — TELEPHONE (OUTPATIENT)
Dept: CARDIOLOGY CLINIC | Age: 82
End: 2022-07-27

## 2022-07-27 NOTE — TELEPHONE ENCOUNTER
Cardiologist: Dr. Vergara Current  Surgeon: Dr. Lyndsey Wang  Surgery: Colonoscopy/EGD  Anesthesia: Propofol  Date: 7/28/2022  FAX# [phone]  Ph#     Last OV 3/16/2022 w/Alexis    CORONARY ARTERY DISEASE:None known  8/2020   Conclusion:Fair exercise tolerance. 5 METs work load. Excessive HR response to exertion suggests A-fib HR is not well controlled. Physiological BP response to exercise. ETT non diagnostic due to baseline abnormalities. HTN:well controlled on current medical regimen, see list above. - changes in  treatment:   no, on Atenolol   CARDIOMYOPATHY: None known   CONGESTIVE HEART FAILURE: NO KNOWN HISTORY.      VHD: Moderate MR / TR  ECHO 8/2020   Left ventricular function is normal, EF is estimated at 55-60%. Diastolic dysfunction could not be evaluated due to arrhythmia. No regional wall motion abnormalities were detected. Bi atrial enlargemnt. Mitral annular calcification is present. Moderate mitral, tricuspid and mild aortic regurgitation is present. No evidence of pericardial effusion. DYSLIPIDEMIA: Patient's profile is not available. Patient is on Mevacor  ARRHYTHMIAS: known                                Patient has H/O Atrial fibrillation                                She is rate controlled & on anticoagulation. takes Eliquis & atenolol  TESTS ORDERED:        Last EKG- 1/11/2020    Stress Test- 8/26/2020   Conclusion:Fair exercise tolerance. 5 METs work load. Excessive HR response to exertion suggests A-fib HR is not well controlled. Physiological BP response to exercise. ETT non diagnostic due to baseline abnormalities      Echo-8/26/2020   Left ventricular function is normal, EF is estimated at 55-60%. Diastolic dysfunction could not be evaluated due to arrhythmia. No regional wall motion abnormalities were detected. Bi atrial enlargemnt. Mitral annular calcification is present. Moderate mitral, tricuspid and mild aortic regurgitation is present.    No evidence of pericardial effusion.         Eliquis

## 2022-07-28 ENCOUNTER — OFFICE (OUTPATIENT)
Dept: URBAN - METROPOLITAN AREA PATHOLOGY 1 | Facility: PATHOLOGY | Age: 82
End: 2022-07-28
Payer: MEDICARE

## 2022-07-28 ENCOUNTER — AMBULATORY SURGICAL CENTER (OUTPATIENT)
Dept: URBAN - METROPOLITAN AREA SURGERY 7 | Facility: SURGERY | Age: 82
End: 2022-07-28

## 2022-07-28 ENCOUNTER — AMBULATORY SURGICAL CENTER (OUTPATIENT)
Dept: URBAN - METROPOLITAN AREA SURGERY 7 | Facility: SURGERY | Age: 82
End: 2022-07-28
Payer: MEDICARE

## 2022-07-28 VITALS
SYSTOLIC BLOOD PRESSURE: 134 MMHG | SYSTOLIC BLOOD PRESSURE: 116 MMHG | DIASTOLIC BLOOD PRESSURE: 66 MMHG | RESPIRATION RATE: 17 BRPM | SYSTOLIC BLOOD PRESSURE: 136 MMHG | DIASTOLIC BLOOD PRESSURE: 55 MMHG | SYSTOLIC BLOOD PRESSURE: 124 MMHG | HEART RATE: 86 BPM | HEART RATE: 86 BPM | HEART RATE: 84 BPM | OXYGEN SATURATION: 93 % | HEART RATE: 85 BPM | HEART RATE: 90 BPM | DIASTOLIC BLOOD PRESSURE: 55 MMHG | TEMPERATURE: 97.5 F | DIASTOLIC BLOOD PRESSURE: 74 MMHG | OXYGEN SATURATION: 97 % | SYSTOLIC BLOOD PRESSURE: 116 MMHG | SYSTOLIC BLOOD PRESSURE: 124 MMHG | DIASTOLIC BLOOD PRESSURE: 72 MMHG | OXYGEN SATURATION: 99 % | OXYGEN SATURATION: 93 % | SYSTOLIC BLOOD PRESSURE: 100 MMHG | RESPIRATION RATE: 19 BRPM | DIASTOLIC BLOOD PRESSURE: 65 MMHG | DIASTOLIC BLOOD PRESSURE: 84 MMHG | HEIGHT: 64 IN | RESPIRATION RATE: 7 BRPM | SYSTOLIC BLOOD PRESSURE: 118 MMHG | HEART RATE: 90 BPM | HEART RATE: 105 BPM | SYSTOLIC BLOOD PRESSURE: 151 MMHG | HEART RATE: 91 BPM | DIASTOLIC BLOOD PRESSURE: 90 MMHG | OXYGEN SATURATION: 98 % | SYSTOLIC BLOOD PRESSURE: 129 MMHG | OXYGEN SATURATION: 99 % | HEART RATE: 105 BPM | RESPIRATION RATE: 16 BRPM | DIASTOLIC BLOOD PRESSURE: 62 MMHG | RESPIRATION RATE: 20 BRPM | HEART RATE: 81 BPM | DIASTOLIC BLOOD PRESSURE: 69 MMHG | RESPIRATION RATE: 19 BRPM | RESPIRATION RATE: 17 BRPM | WEIGHT: 154 LBS | RESPIRATION RATE: 9 BRPM | OXYGEN SATURATION: 90 % | HEART RATE: 98 BPM | TEMPERATURE: 97.5 F | HEART RATE: 77 BPM | RESPIRATION RATE: 18 BRPM | HEART RATE: 91 BPM | HEART RATE: 89 BPM | DIASTOLIC BLOOD PRESSURE: 72 MMHG | SYSTOLIC BLOOD PRESSURE: 125 MMHG | RESPIRATION RATE: 9 BRPM | DIASTOLIC BLOOD PRESSURE: 59 MMHG | SYSTOLIC BLOOD PRESSURE: 151 MMHG | DIASTOLIC BLOOD PRESSURE: 70 MMHG | DIASTOLIC BLOOD PRESSURE: 71 MMHG | SYSTOLIC BLOOD PRESSURE: 153 MMHG | DIASTOLIC BLOOD PRESSURE: 62 MMHG | SYSTOLIC BLOOD PRESSURE: 118 MMHG | DIASTOLIC BLOOD PRESSURE: 90 MMHG | SYSTOLIC BLOOD PRESSURE: 153 MMHG | DIASTOLIC BLOOD PRESSURE: 66 MMHG | OXYGEN SATURATION: 96 % | OXYGEN SATURATION: 100 % | OXYGEN SATURATION: 96 % | OXYGEN SATURATION: 83 % | HEART RATE: 83 BPM | HEART RATE: 77 BPM | HEART RATE: 98 BPM | RESPIRATION RATE: 7 BRPM | HEART RATE: 81 BPM | OXYGEN SATURATION: 100 % | OXYGEN SATURATION: 98 % | HEART RATE: 85 BPM | DIASTOLIC BLOOD PRESSURE: 74 MMHG | DIASTOLIC BLOOD PRESSURE: 70 MMHG | HEART RATE: 124 BPM | SYSTOLIC BLOOD PRESSURE: 100 MMHG | RESPIRATION RATE: 18 BRPM | HEART RATE: 89 BPM | OXYGEN SATURATION: 83 % | HEIGHT: 64 IN | DIASTOLIC BLOOD PRESSURE: 69 MMHG | OXYGEN SATURATION: 97 % | HEART RATE: 83 BPM | SYSTOLIC BLOOD PRESSURE: 136 MMHG | RESPIRATION RATE: 16 BRPM | SYSTOLIC BLOOD PRESSURE: 134 MMHG | DIASTOLIC BLOOD PRESSURE: 84 MMHG | HEART RATE: 124 BPM | RESPIRATION RATE: 14 BRPM | HEART RATE: 84 BPM | WEIGHT: 154 LBS | DIASTOLIC BLOOD PRESSURE: 71 MMHG | SYSTOLIC BLOOD PRESSURE: 125 MMHG | OXYGEN SATURATION: 90 % | DIASTOLIC BLOOD PRESSURE: 65 MMHG | RESPIRATION RATE: 20 BRPM | RESPIRATION RATE: 14 BRPM | DIASTOLIC BLOOD PRESSURE: 59 MMHG | SYSTOLIC BLOOD PRESSURE: 129 MMHG

## 2022-07-28 DIAGNOSIS — K31.819 ANGIODYSPLASIA OF STOMACH AND DUODENUM WITHOUT BLEEDING: ICD-10-CM

## 2022-07-28 DIAGNOSIS — K29.30 CHRONIC SUPERFICIAL GASTRITIS WITHOUT BLEEDING: ICD-10-CM

## 2022-07-28 DIAGNOSIS — C18.2 MALIGNANT NEOPLASM OF ASCENDING COLON: ICD-10-CM

## 2022-07-28 DIAGNOSIS — D50.9 IRON DEFICIENCY ANEMIA, UNSPECIFIED: ICD-10-CM

## 2022-07-28 DIAGNOSIS — K57.30 DIVERTICULOSIS OF LARGE INTESTINE WITHOUT PERFORATION OR ABS: ICD-10-CM

## 2022-07-28 DIAGNOSIS — K44.9 DIAPHRAGMATIC HERNIA WITHOUT OBSTRUCTION OR GANGRENE: ICD-10-CM

## 2022-07-28 PROBLEM — K31.89 OTHER DISEASES OF STOMACH AND DUODENUM: Status: ACTIVE | Noted: 2022-07-28

## 2022-07-28 PROBLEM — D37.4 NEOPLASM OF UNCERTAIN BEHAVIOR OF COLON: Status: ACTIVE | Noted: 2022-07-28

## 2022-07-28 PROBLEM — K63.5 POLYP OF COLON: Status: ACTIVE | Noted: 2022-07-28

## 2022-07-28 PROBLEM — K56.609 UNSP INTESTNL OBST, UNSP AS TO PARTIAL VERSUS COMPLETE OBST: Status: ACTIVE | Noted: 2022-07-28

## 2022-07-28 PROCEDURE — 45380 COLONOSCOPY AND BIOPSY: CPT | Performed by: INTERNAL MEDICINE

## 2022-07-28 PROCEDURE — 45381 COLONOSCOPY SUBMUCOUS NJX: CPT | Performed by: INTERNAL MEDICINE

## 2022-07-28 PROCEDURE — 43239 EGD BIOPSY SINGLE/MULTIPLE: CPT | Mod: XS | Performed by: INTERNAL MEDICINE

## 2022-07-28 PROCEDURE — 88312 SPECIAL STAINS GROUP 1: CPT | Performed by: PATHOLOGY

## 2022-07-28 PROCEDURE — 88341 IMHCHEM/IMCYTCHM EA ADD ANTB: CPT | Performed by: PATHOLOGY

## 2022-07-28 PROCEDURE — 43270 EGD LESION ABLATION: CPT | Performed by: INTERNAL MEDICINE

## 2022-07-28 PROCEDURE — 88305 TISSUE EXAM BY PATHOLOGIST: CPT | Performed by: PATHOLOGY

## 2022-07-28 PROCEDURE — 88342 IMHCHEM/IMCYTCHM 1ST ANTB: CPT | Performed by: PATHOLOGY

## 2022-08-02 LAB
PDF: PDF REPORT: (no result)
PDF: PDF REPORT: (no result)

## 2022-08-16 ENCOUNTER — TELEPHONE (OUTPATIENT)
Dept: CARDIOLOGY CLINIC | Age: 82
End: 2022-08-16

## 2022-08-16 NOTE — TELEPHONE ENCOUNTER
Cardiologist: Dr. Gwen Ellison  Surgeon: Dr. Jacques Orosco  Surgery: Lap. Or Robotic right Hemicolectomy  Anesthesia: General  Date: 8/26/2022  FAX# [phone]  # [phone]        Last OV 3/16/2022 w/Alexis     CORONARY ARTERY DISEASE:None known  8/2020   Conclusion:Fair exercise tolerance. 5 METs work load. Excessive HR response to exertion suggests A-fib HR is not well controlled. Physiological BP response to exercise. ETT non diagnostic due to baseline abnormalities. HTN:well controlled on current medical regimen, see list above. - changes in  treatment:   no, on Atenolol   CARDIOMYOPATHY: None known   CONGESTIVE HEART FAILURE: NO KNOWN HISTORY.      VHD: Moderate MR / TR  ECHO 8/2020   Left ventricular function is normal, EF is estimated at 55-60%. Diastolic dysfunction could not be evaluated due to arrhythmia. No regional wall motion abnormalities were detected. Bi atrial enlargemnt. Mitral annular calcification is present. Moderate mitral, tricuspid and mild aortic regurgitation is present. No evidence of pericardial effusion. DYSLIPIDEMIA: Patient's profile is not available. Patient is on Mevacor  ARRHYTHMIAS: known                                Patient has H/O Atrial fibrillation                                She is rate controlled & on anticoagulation. takes Eliquis & atenolol  TESTS ORDERED:           Last EKG- 1/11/2020     Stress Test- 8/26/2020   Conclusion:Fair exercise tolerance. 5 METs work load. Excessive HR response to exertion suggests A-fib HR is not well controlled. Physiological BP response to exercise. ETT non diagnostic due to baseline abnormalities        Echo-8/26/2020   Left ventricular function is normal, EF is estimated at 55-60%. Diastolic dysfunction could not be evaluated due to arrhythmia. No regional wall motion abnormalities were detected. Bi atrial enlargemnt. Mitral annular calcification is present.    Moderate mitral, tricuspid and mild aortic regurgitation is present. No evidence of pericardial effusion.            Eliquis

## 2022-08-23 ENCOUNTER — TELEPHONE (OUTPATIENT)
Dept: CARDIOLOGY CLINIC | Age: 82
End: 2022-08-23

## 2023-02-07 ENCOUNTER — TELEPHONE (OUTPATIENT)
Dept: CARDIOLOGY CLINIC | Age: 83
End: 2023-02-07

## 2023-03-16 ENCOUNTER — OFFICE VISIT (OUTPATIENT)
Dept: CARDIOLOGY CLINIC | Age: 83
End: 2023-03-16
Payer: MEDICARE

## 2023-03-16 VITALS
HEART RATE: 86 BPM | BODY MASS INDEX: 26.94 KG/M2 | HEIGHT: 64 IN | WEIGHT: 157.8 LBS | SYSTOLIC BLOOD PRESSURE: 122 MMHG | DIASTOLIC BLOOD PRESSURE: 62 MMHG

## 2023-03-16 DIAGNOSIS — I38 VHD (VALVULAR HEART DISEASE): ICD-10-CM

## 2023-03-16 DIAGNOSIS — I48.0 PAROXYSMAL ATRIAL FIBRILLATION (HCC): ICD-10-CM

## 2023-03-16 DIAGNOSIS — I10 ESSENTIAL HYPERTENSION: Primary | ICD-10-CM

## 2023-03-16 DIAGNOSIS — E78.5 DYSLIPIDEMIA: ICD-10-CM

## 2023-03-16 PROCEDURE — 1123F ACP DISCUSS/DSCN MKR DOCD: CPT | Performed by: INTERNAL MEDICINE

## 2023-03-16 PROCEDURE — 3074F SYST BP LT 130 MM HG: CPT | Performed by: INTERNAL MEDICINE

## 2023-03-16 PROCEDURE — 99213 OFFICE O/P EST LOW 20 MIN: CPT | Performed by: INTERNAL MEDICINE

## 2023-03-16 PROCEDURE — 3078F DIAST BP <80 MM HG: CPT | Performed by: INTERNAL MEDICINE

## 2023-03-16 RX ORDER — LOVASTATIN 20 MG/1
20 TABLET ORAL DAILY
Qty: 90 TABLET | Refills: 3 | Status: SHIPPED | OUTPATIENT
Start: 2023-03-16

## 2023-03-16 RX ORDER — ATENOLOL 25 MG/1
25 TABLET ORAL 2 TIMES DAILY
Qty: 180 TABLET | Refills: 3 | Status: SHIPPED | OUTPATIENT
Start: 2023-03-16

## 2023-03-16 RX ORDER — ATENOLOL 50 MG/1
TABLET ORAL
Qty: 180 TABLET | Refills: 3 | Status: SHIPPED | OUTPATIENT
Start: 2023-03-16

## 2023-03-16 NOTE — PROGRESS NOTES
OFFICE PROGRESS NOTES      Scott Ling is a 80 y.o. female who has    CHIEF COMPLAINT AS FOLLOWS:  CHEST PAIN: Patient denies any C/O chest pains at this time. SOB: No C/O SOB at this time. LEG EDEMA: No leg edema   PALPITATIONS: Denies any C/O Palpitations   DIZZINESS: No C/O Dizziness   SYNCOPE: None   OTHER/ ADDITIONAL COMPLAINTS:                                     HPI: Patient is here for F/U on her Arrhythmia, HTN & Dyslipidemia problems. Arrhythmia: Patient has known  A-fib. HTN: Patient has known essential HTN. Has been treated with guideline recommended medical / physical/ diet therapy as stated below. Dyslipidemia: Patient has known mixed dyslipidemia. Has been treated with guideline recommended medical / physical/ diet therapy as stated below. Current Outpatient Medications   Medication Sig Dispense Refill    lovastatin (MEVACOR) 20 MG tablet Take 1 tablet by mouth daily 90 tablet 3    apixaban (ELIQUIS) 5 MG TABS tablet Take 1 tablet by mouth 2 times daily 180 tablet 3    atenolol (TENORMIN) 50 MG tablet Take one tablet  tablet 3    atenolol (TENORMIN) 25 MG tablet Take 1 tablet by mouth 2 times daily 180 tablet 3     No current facility-administered medications for this visit.      Allergies: Penicillins  Review of Systems:    Constitutional: Negative for diaphoresis and fatigue  Respiratory: Negative for shortness of breath  Cardiovascular: Negative for chest pain, dyspnea on exertion, claudication, edema, irregular heartbeat, murmur, palpitations or shortness of breath  Musculoskeletal: Negative for muscle pain, muscular weakness, negative for pain in arm and leg or swelling in foot and leg    Objective:  /62 (Site: Left Upper Arm, Position: Sitting, Cuff Size: Medium Adult)   Pulse 86   Ht 5' 4\" (1.626 m)   Wt 157 lb 12.8 oz (71.6 kg)   BMI 27.09 kg/m²   Wt Readings from Last 3 Encounters:   03/16/23 157 lb 12.8 oz (71.6 kg)   03/16/22 170 lb (77.1 kg)   03/15/21 169 lb 12.8 oz (77 kg)     Body mass index is 27.09 kg/m². GENERAL - Alert, oriented, pleasant, in no apparent distress. EYES: No jaundice, no conjunctival pallor. Neck - Supple. No jugular venous distention noted. No carotid bruits. Cardiovascular - Normal S1 and S2 without obvious murmur or gallop. Extremities - No cyanosis, clubbing, or significant edema. Pulmonary - No respiratory distress. No wheezes or rales. MEDICAL DECISION MAKING & DATA REVIEW:    Lab Review   No results found for: TROPONINT  No results found for: BNP, PROBNP  No results found for: INR  Lab Results   Component Value Date    LABA1C 6.1 03/31/2022     Lab Results   Component Value Date    WBC 5.7 03/31/2022    HCT 31.6 (A) 03/31/2022    MCV 79.0 03/31/2022     03/31/2022     Lab Results   Component Value Date    CHOL 157 03/31/2022    TRIG 66 03/31/2022    HDL 61 03/31/2022    LDLCALC 83 03/31/2022     Lab Results   Component Value Date    ALT 25 03/31/2022    AST 23 03/31/2022     BMP:    Lab Results   Component Value Date/Time     03/31/2022 12:00 AM    K 4.4 03/31/2022 12:00 AM     03/31/2022 12:00 AM    CO2 25 03/31/2022 12:00 AM    BUN 15 03/31/2022 12:00 AM    CREATININE 1.2 03/31/2022 12:00 AM     CMP:   Lab Results   Component Value Date/Time     03/31/2022 12:00 AM    K 4.4 03/31/2022 12:00 AM     03/31/2022 12:00 AM    CO2 25 03/31/2022 12:00 AM    BUN 15 03/31/2022 12:00 AM    CREATININE 1.2 03/31/2022 12:00 AM     Lab Results   Component Value Date/Time    TSH 2.100 03/31/2022 12:00 AM       QUALITY MEASURES REVIEWED:  1.CAD:Patient is taking anti platelet agent:No  Patient does not have Hx of documented CAD  2. DYSLIPIDEMIA: Patient is on cholesterol lowering medication:Yes   3. Beta-Blocker therapy for CAD, if prior Myocardial Infarction:Yes   4. Counselled regarding smoking cessation. No   Patient does not Smoke.   5.Anticoagulation therapy (for A.Fib) Yes . 6.Discussed weight management strategies. Assessment & Plan:  Primary / Secondary prevention is the goal by aggressive risk modification, healthy and therapeutic life style changes for cardiovascular risk reduction. CORONARY ARTERY DISEASE::None known  8/2020   Conclusion:Fair exercise tolerance. 5 METs work load. Excessive HR response to exertion suggests A-fib HR is not well controlled. Physiological BP response to exercise. ETT non diagnostic due to baseline abnormalities. HTN:well controlled on current medical regimen, see list above. - changes in  treatment:   no, on Atenolol   CARDIOMYOPATHY: None known   CONGESTIVE HEART FAILURE: NO KNOWN HISTORY.      VHD: Moderate MR / TR  ECHO 8/2020   Left ventricular function is normal, EF is estimated at 55-60%. Diastolic dysfunction could not be evaluated due to arrhythmia. No regional wall motion abnormalities were detected. Bi atrial enlargemnt. Mitral annular calcification is present. Moderate mitral, tricuspid and mild aortic regurgitation is present. No evidence of pericardial effusion. DYSLIPIDEMIA: Patient's profile is not available. Patient is on Mevacor. ARRHYTHMIAS: known                                Patient has H/O Atrial fibrillation                                She is rate controlled & on anticoagulation. takes Eliquis & atenolol    TESTS ORDERED:none this visit     PREVIOUSLY ORDERED TESTS REVIEWED & DISCUSSED WITH THE PATIENT:     I personally reviewed & interpreted, all previously ordered tests as copied above. Latest Labs are pulled in to the note with dates. Labs, specially in Reference to Lipid profile, Cardiac testing in the form of Echo ( dated: ), stress tests ( dated: ) & other relevant cardiac testing reviewed with patient & recommendations made based on assessment of the results.     Discussed role of Cardiac risk factors & effects + treatment of co morbidities with patient & advised accordingly. MEDICATIONS: List of medications patient is currently taking is reviewed in detail with the patient. Discussed any side effects or problems taking the medication. Recommend Continue present management & medications as listed. AFFIRMATION: I spent at least 20 minutes of time reviewing patient's history, previous & current medical problems & all Labs + testing. This includes chart prep even prior to the vosit. Various goals are discussed and multiple questions answered. Relevant concelling performed. Office follow up in one year.

## 2023-03-16 NOTE — PATIENT INSTRUCTIONS
CORONARY ARTERY DISEASE::None known  8/2020   Conclusion:Fair exercise tolerance. 5 METs work load. Excessive HR response to exertion suggests A-fib HR is not well controlled. Physiological BP response to exercise. ETT non diagnostic due to baseline abnormalities. HTN:well controlled on current medical regimen, see list above. - changes in  treatment:   no, on Atenolol   CARDIOMYOPATHY: None known   CONGESTIVE HEART FAILURE: NO KNOWN HISTORY.      VHD: Moderate MR / TR  ECHO 8/2020   Left ventricular function is normal, EF is estimated at 55-60%. Diastolic dysfunction could not be evaluated due to arrhythmia. No regional wall motion abnormalities were detected. Bi atrial enlargemnt. Mitral annular calcification is present. Moderate mitral, tricuspid and mild aortic regurgitation is present. No evidence of pericardial effusion. DYSLIPIDEMIA: Patient's profile is not available. Patient is on Mevacor. ARRHYTHMIAS: known                                Patient has H/O Atrial fibrillation                                She is rate controlled & on anticoagulation. takes Eliquis & atenolol    TESTS ORDERED:none this visit     PREVIOUSLY ORDERED TESTS REVIEWED & DISCUSSED WITH THE PATIENT:     I personally reviewed & interpreted, all previously ordered tests as copied above. Latest Labs are pulled in to the note with dates. Labs, specially in Reference to Lipid profile, Cardiac testing in the form of Echo ( dated: ), stress tests ( dated: ) & other relevant cardiac testing reviewed with patient & recommendations made based on assessment of the results. Discussed role of Cardiac risk factors & effects + treatment of co morbidities with patient & advised accordingly. MEDICATIONS: List of medications patient is currently taking is reviewed in detail with the patient. Discussed any side effects or problems taking the medication.      Recommend Continue present management & medications as listed. AFFIRMATION: I spent at least 20 minutes of time reviewing patient's history, previous & current medical problems & all Labs + testing. This includes chart prep even prior to the vosit. Various goals are discussed and multiple questions answered. Relevant concelling performed. Office follow up in one year.

## 2023-03-16 NOTE — LETTER
March 16, 2023      Kp Lucero      Patient: Marielle Hill   MR Number: [id number]   YOB: 1940   Date of Visit: 3/16/2023       Dear Tejinder Blevins:    Thank you for referring Marielle Hill to me for evaluation/treatment. Below are the relevant portions of my assessment and plan of care. If you have questions, please do not hesitate to call me. I look forward to following Frida Gutierrez along with you.     Sincerely,        Belgica Ritter MD

## 2023-09-26 ENCOUNTER — TELEPHONE (OUTPATIENT)
Dept: CARDIOLOGY CLINIC | Age: 83
End: 2023-09-26

## 2023-09-26 NOTE — TELEPHONE ENCOUNTER
Cardiologist: Dr. Greer Failing  Surgeon: Dr. Philip Hyatt  Surgery: Colonoscopy  Anesthesia: Propofol  Date: 9/29/2023  FAX# [phone]  # [phone]    Last OV 3/16/2023 w/Alexis       CORONARY ARTERY DISEASE::None known  8/2020   Conclusion:Fair exercise tolerance. 5 METs work load. Excessive HR response to exertion suggests A-fib HR is not well controlled. Physiological BP response to exercise. ETT non diagnostic due to baseline abnormalities. HTN:well controlled on current medical regimen, see list above. - changes in  treatment:   no, on Atenolol   CARDIOMYOPATHY: None known   CONGESTIVE HEART FAILURE: NO KNOWN HISTORY.      VHD: Moderate MR / TR       DYSLIPIDEMIA: Patient's profile is not available. Patient is on Mevacor. ARRHYTHMIAS: known                                Patient has H/O Atrial fibrillation                                She is rate controlled & on anticoagulation. takes Eliquis & atenolol         Last  EKG- 8/22/2022        Stress Test- 8/26/2020   Conclusion:Fair exercise tolerance. 5 METs work load. Excessive HR response to exertion suggests A-fib HR is not well controlled. Physiological BP response to exercise. ETT non diagnostic due to baseline abnormalities. Echo-  8/26/2020   Left ventricular function is normal, EF is estimated at 55-60%. Diastolic dysfunction could not be evaluated due to arrhythmia. No regional wall motion abnormalities were detected. Bi atrial enlargemnt. Mitral annular calcification is present. Moderate mitral, tricuspid and mild aortic regurgitation is present. No evidence of pericardial effusion.       Eliquis

## 2023-09-29 ENCOUNTER — AMBULATORY SURGICAL CENTER (OUTPATIENT)
Dept: URBAN - METROPOLITAN AREA SURGERY 7 | Facility: SURGERY | Age: 83
End: 2023-09-29
Payer: MEDICARE

## 2023-09-29 VITALS
TEMPERATURE: 97.7 F | DIASTOLIC BLOOD PRESSURE: 88 MMHG | RESPIRATION RATE: 22 BRPM | SYSTOLIC BLOOD PRESSURE: 153 MMHG | HEART RATE: 105 BPM | HEART RATE: 75 BPM | HEART RATE: 101 BPM | OXYGEN SATURATION: 92 % | DIASTOLIC BLOOD PRESSURE: 88 MMHG | SYSTOLIC BLOOD PRESSURE: 119 MMHG | OXYGEN SATURATION: 98 % | RESPIRATION RATE: 21 BRPM | DIASTOLIC BLOOD PRESSURE: 83 MMHG | SYSTOLIC BLOOD PRESSURE: 159 MMHG | HEART RATE: 78 BPM | HEART RATE: 83 BPM | DIASTOLIC BLOOD PRESSURE: 74 MMHG | DIASTOLIC BLOOD PRESSURE: 96 MMHG | RESPIRATION RATE: 16 BRPM | SYSTOLIC BLOOD PRESSURE: 153 MMHG | HEART RATE: 105 BPM | RESPIRATION RATE: 21 BRPM | HEART RATE: 92 BPM | RESPIRATION RATE: 22 BRPM | HEART RATE: 84 BPM | DIASTOLIC BLOOD PRESSURE: 96 MMHG | SYSTOLIC BLOOD PRESSURE: 150 MMHG | WEIGHT: 160 LBS | OXYGEN SATURATION: 99 % | HEART RATE: 82 BPM | DIASTOLIC BLOOD PRESSURE: 97 MMHG | DIASTOLIC BLOOD PRESSURE: 92 MMHG | HEART RATE: 59 BPM | SYSTOLIC BLOOD PRESSURE: 119 MMHG | TEMPERATURE: 97.7 F | DIASTOLIC BLOOD PRESSURE: 83 MMHG | DIASTOLIC BLOOD PRESSURE: 97 MMHG | SYSTOLIC BLOOD PRESSURE: 143 MMHG | RESPIRATION RATE: 19 BRPM | OXYGEN SATURATION: 100 % | HEART RATE: 83 BPM | SYSTOLIC BLOOD PRESSURE: 143 MMHG | OXYGEN SATURATION: 99 % | SYSTOLIC BLOOD PRESSURE: 161 MMHG | HEART RATE: 92 BPM | WEIGHT: 160 LBS | HEART RATE: 78 BPM | RESPIRATION RATE: 19 BRPM | SYSTOLIC BLOOD PRESSURE: 159 MMHG | RESPIRATION RATE: 20 BRPM | HEART RATE: 75 BPM | HEIGHT: 64 IN | HEART RATE: 82 BPM | RESPIRATION RATE: 20 BRPM | DIASTOLIC BLOOD PRESSURE: 74 MMHG | DIASTOLIC BLOOD PRESSURE: 92 MMHG | HEART RATE: 101 BPM | HEIGHT: 64 IN | SYSTOLIC BLOOD PRESSURE: 155 MMHG | OXYGEN SATURATION: 92 % | HEART RATE: 59 BPM | SYSTOLIC BLOOD PRESSURE: 155 MMHG | OXYGEN SATURATION: 98 % | HEART RATE: 84 BPM | SYSTOLIC BLOOD PRESSURE: 161 MMHG | RESPIRATION RATE: 16 BRPM | OXYGEN SATURATION: 100 % | SYSTOLIC BLOOD PRESSURE: 150 MMHG

## 2023-09-29 DIAGNOSIS — Z08 ENCOUNTER FOR FOLLOW-UP EXAMINATION AFTER COMPLETED TREATMEN: ICD-10-CM

## 2023-09-29 DIAGNOSIS — K57.30 DIVERTICULOSIS OF LARGE INTESTINE WITHOUT PERFORATION OR ABS: ICD-10-CM

## 2023-09-29 DIAGNOSIS — Z85.038 PERSONAL HISTORY OF OTHER MALIGNANT NEOPLASM OF LARGE INTEST: ICD-10-CM

## 2023-09-29 PROBLEM — C18.9 COLON CANCER: Status: ACTIVE | Noted: 2023-09-29

## 2023-09-29 PROBLEM — Z85.00: Status: ACTIVE | Noted: 2023-09-29

## 2023-09-29 PROCEDURE — G0105 COLORECTAL SCRN; HI RISK IND: HCPCS | Performed by: INTERNAL MEDICINE

## 2024-03-11 RX ORDER — ATENOLOL 50 MG/1
TABLET ORAL
Qty: 180 TABLET | Refills: 3 | Status: SHIPPED | OUTPATIENT
Start: 2024-03-11

## 2024-03-11 RX ORDER — ATENOLOL 25 MG/1
25 TABLET ORAL 2 TIMES DAILY
Qty: 180 TABLET | Refills: 3 | Status: SHIPPED | OUTPATIENT
Start: 2024-03-11

## 2024-03-14 ENCOUNTER — OFFICE VISIT (OUTPATIENT)
Dept: CARDIOLOGY CLINIC | Age: 84
End: 2024-03-14
Payer: MEDICARE

## 2024-03-14 VITALS
HEART RATE: 78 BPM | BODY MASS INDEX: 28.99 KG/M2 | WEIGHT: 169.8 LBS | DIASTOLIC BLOOD PRESSURE: 74 MMHG | OXYGEN SATURATION: 100 % | HEIGHT: 64 IN | SYSTOLIC BLOOD PRESSURE: 138 MMHG

## 2024-03-14 DIAGNOSIS — E78.5 DYSLIPIDEMIA: ICD-10-CM

## 2024-03-14 DIAGNOSIS — I38 VHD (VALVULAR HEART DISEASE): ICD-10-CM

## 2024-03-14 DIAGNOSIS — I48.0 PAROXYSMAL ATRIAL FIBRILLATION (HCC): ICD-10-CM

## 2024-03-14 DIAGNOSIS — I10 ESSENTIAL HYPERTENSION: Primary | ICD-10-CM

## 2024-03-14 DIAGNOSIS — R00.2 PALPITATIONS: ICD-10-CM

## 2024-03-14 PROCEDURE — 99214 OFFICE O/P EST MOD 30 MIN: CPT | Performed by: INTERNAL MEDICINE

## 2024-03-14 PROCEDURE — 3078F DIAST BP <80 MM HG: CPT | Performed by: INTERNAL MEDICINE

## 2024-03-14 PROCEDURE — 93000 ELECTROCARDIOGRAM COMPLETE: CPT | Performed by: INTERNAL MEDICINE

## 2024-03-14 PROCEDURE — 1123F ACP DISCUSS/DSCN MKR DOCD: CPT | Performed by: INTERNAL MEDICINE

## 2024-03-14 PROCEDURE — 3075F SYST BP GE 130 - 139MM HG: CPT | Performed by: INTERNAL MEDICINE

## 2024-03-14 NOTE — PATIENT INSTRUCTIONS
advised accordingly.     MEDICATIONS: List of medications patient is currently taking is reviewed in detail with the patient. Discussed any side effects or problems taking the medication.     Recommend Continue present management & medications as listed.     AFFIRMATION: I spent at least 20 minutes of time reviewing patient's history, previous & current medical problems & all Labs + testing. This includes chart prep even prior to the vosit. Various goals are discussed and multiple questions answered.Relevant concelling performed.     Office follow up in one year.

## 2024-03-14 NOTE — PROGRESS NOTES
[id number] Score for Atrial Fibrillation Stroke Risk   Risk   Factors  Component Value   C CHF No 0   H HTN Yes 1   A2 Age >= 75 Yes,  (83 y.o.) 2   D DM No 0   S2 Prior Stroke/TIA No 0   V Vascular Disease No 0   A Age 65-74 No,  (83 y.o.) 0   Sc Sex female 1    [id number]  Score  4   Score last updated 3/14/24 1:40 PM EDT    Click here for a link to the UpToDate guideline \"Atrial Fibrillation: Anticoagulation therapy to prevent embolization    Disclaimer: Risk Score calculation is dependent on accuracy of patient problem list and past encounter diagnosis.    
cessation. No   Patient does not Smoke.  5.Anticoagulation therapy (for A.Fib) Yes   6.Discussed weight management strategies.    Assessment & Plan:  Primary / Secondary prevention is the goal by aggressive risk modification, healthy and therapeutic life style changes for cardiovascular risk reduction.     CORONARY ARTERY DISEASE:None known  8/2020   Conclusion:Fair exercise tolerance. 5 METs work load.   Excessive HR response to exertion suggests A-fib HR is not well controlled.   Physiological BP response to exercise.   ETT non diagnostic due to baseline abnormalities.  HTN:well controlled on current medical regimen, see list above.              - changes in  treatment:   no, on Atenolol   CARDIOMYOPATHY: None known   CONGESTIVE HEART FAILURE: NO KNOWN HISTORY.      VHD: Moderate MR / TR  ECHO 8/2020   Left ventricular function is normal, EF is estimated at 55-60%.   Diastolic dysfunction could not be evaluated due to arrhythmia.   No regional wall motion abnormalities were detected.   Bi atrial enlargemnt.   Mitral annular calcification is present.   Moderate mitral, tricuspid and mild aortic regurgitation is present.   No evidence of pericardial effusion.     DYSLIPIDEMIA: Patient's profile is not available. Patient is on Mevacor.     ARRHYTHMIAS: known                                Patient has H/O Atrial fibrillation                                She is rate controlled & on anticoagulation.takes Eliquis & atenolol.  EKG: A-FIB 69/min, low voltage in chest leads.    ? CVI:  Advised patient to continue to wear compression stockings.  Need to Diet Exercise & Loose weight.  Increase walking while wearing compression stockings.  Keep feet propped up while seated.       TESTS ORDERED:Echo     PREVIOUSLY ORDERED TESTS REVIEWED & DISCUSSED WITH THE PATIENT:     I personally reviewed & interpreted, all previously ordered tests as copied above. Latest Labs are pulled in to the note with dates.   Labs, specially in

## 2024-04-02 ENCOUNTER — TELEPHONE (OUTPATIENT)
Dept: CARDIOLOGY CLINIC | Age: 84
End: 2024-04-02

## 2024-04-02 NOTE — TELEPHONE ENCOUNTER
Called patient to provide her with results. Could not leave a message due to voicemail not being set up.         Left Ventricle: Normal left ventricular systolic function with a visually estimated EF of 55 - 60%. Left ventricle size is normal. Mildly increased wall thickness. Normal wall motion. Diastolic function indeterminate in the setting of atrial fibrillation.    Aortic Valve: Sclerotic but non-stenotic. Moderate regurgitation.    Mitral Valve: Annular calcification of the mitral valve. Mild to moderate regurgitation.    Tricuspid Valve: Mild regurgitation. Mild Pulmonary Hypertension with an RVSP of 40 mmHg.    Left Atrium: Left atrium is mildly dilated.    Pericardium: No pericardial effusion.    Image quality is good.

## 2024-04-04 NOTE — TELEPHONE ENCOUNTER
Called patient to provide her with results. Could not leave a message due to the voicemail box not being set up.

## 2024-04-10 ENCOUNTER — TELEPHONE (OUTPATIENT)
Dept: CARDIOLOGY CLINIC | Age: 84
End: 2024-04-10

## 2024-04-10 NOTE — TELEPHONE ENCOUNTER
Joeyk to pt and we cleared up the phone issue.  The second number is the best number to call.  I told the pt what Deepthi explained :    Left Ventricle: Normal left ventricular systolic function with a visually estimated EF of 55 - 60%. Left ventricle size is normal. Mildly increased wall thickness. Normal wall motion. Diastolic function indeterminate in the setting of atrial fibrillation.    Aortic Valve: Sclerotic but non-stenotic. Moderate regurgitation.    Mitral Valve: Annular calcification of the mitral valve. Mild to moderate regurgitation.    Tricuspid Valve: Mild regurgitation. Mild Pulmonary Hypertension with an RVSP of 40 mmHg.    Left Atrium: Left atrium is mildly dilated.    Pericardium: No pericardial effusion.    Image quality is good.  Pt verbally understood.  We confirmed her appt with Dr. Espinoza next March 2025.

## 2025-03-10 RX ORDER — ATENOLOL 50 MG/1
50 TABLET ORAL 2 TIMES DAILY
Qty: 180 TABLET | Refills: 3 | Status: SHIPPED | OUTPATIENT
Start: 2025-03-10

## 2025-03-10 RX ORDER — ATENOLOL 25 MG/1
25 TABLET ORAL 2 TIMES DAILY
Qty: 180 TABLET | Refills: 3 | Status: SHIPPED | OUTPATIENT
Start: 2025-03-10

## 2025-03-20 NOTE — PROGRESS NOTES
Patient Name: Vilma Pham  : 1940  MRN# [id number]    REASON FOR VISIT: 1 year follow up  Patient Active Problem List    Diagnosis Date Noted    Dyslipidemia 2020    Essential hypertension 2020    VHD (valvular heart disease) 2020    Palpitations 2020    Atrial fibrillation (HCC) 2020         LABS:  Contains abnormal data LIPID PANEL (CORONARY RISK)  Specimen: Venous.  Component  Ref Range & Units 2 yr ago Comments   CHOLESTEROL, TOTAL  <200 MG/ High  (NOTE)  DESIRABLE:   <200 MG/DL  BORDERLINE:  200-239 MG/DL  HIGHER RISK:  >239 MG/DL   TRIGLYCERIDE  <150 MG/DL 82 (NOTE)  NORMAL: <150 MG/DL  BORDERLINE HIGH: 150-199 MG/DL  HIGH: 200-499 MG/DL  VERY HIGH: > OR = 500 MG/DL    \"Nonfasting specimens may have modest increases in Triglyceride  levels\" ([employer] [facility], 2019)   HDL CHOLESTEROL  MG/DL 77 (NOTE)  DESIRABLE: > OR = 60 MG/DL  HIGHER RISK: <40 MG/DL   VLDL  4 - 38 MG/DL 16    LDL (CALCULATED)  MG/          Component  Ref Range & Units (hover) 3/31/22   TSH 2.100             Hemoglobin A1C   Date Value Ref Range Status   2022 6.1 % Final

## 2025-03-25 ENCOUNTER — OFFICE VISIT (OUTPATIENT)
Dept: CARDIOLOGY CLINIC | Age: 85
End: 2025-03-25
Payer: MEDICARE

## 2025-03-25 VITALS
HEART RATE: 80 BPM | BODY MASS INDEX: 29.19 KG/M2 | HEIGHT: 64 IN | DIASTOLIC BLOOD PRESSURE: 66 MMHG | WEIGHT: 171 LBS | SYSTOLIC BLOOD PRESSURE: 110 MMHG

## 2025-03-25 DIAGNOSIS — I48.0 PAROXYSMAL ATRIAL FIBRILLATION (HCC): ICD-10-CM

## 2025-03-25 DIAGNOSIS — I10 ESSENTIAL HYPERTENSION: Primary | ICD-10-CM

## 2025-03-25 DIAGNOSIS — I38 VHD (VALVULAR HEART DISEASE): ICD-10-CM

## 2025-03-25 DIAGNOSIS — E78.5 DYSLIPIDEMIA: ICD-10-CM

## 2025-03-25 PROCEDURE — 99214 OFFICE O/P EST MOD 30 MIN: CPT | Performed by: INTERNAL MEDICINE

## 2025-03-25 PROCEDURE — 1123F ACP DISCUSS/DSCN MKR DOCD: CPT | Performed by: INTERNAL MEDICINE

## 2025-03-25 PROCEDURE — 1159F MED LIST DOCD IN RCRD: CPT | Performed by: INTERNAL MEDICINE

## 2025-03-25 PROCEDURE — 1160F RVW MEDS BY RX/DR IN RCRD: CPT | Performed by: INTERNAL MEDICINE

## 2025-03-25 PROCEDURE — 3074F SYST BP LT 130 MM HG: CPT | Performed by: INTERNAL MEDICINE

## 2025-03-25 PROCEDURE — 3078F DIAST BP <80 MM HG: CPT | Performed by: INTERNAL MEDICINE

## 2025-03-25 NOTE — PATIENT INSTRUCTIONS
Thank you for allowing us to care for you today!   We want to ensure we can follow your treatment plan and we strive to give you the best outcomes and experience possible.   If you ever have a life threatening emergency and call 911 - for an ambulance (EMS)  REMEMBER  Our providers can only care for you at:   [facility] or [facility]   Even if you have someone take you or you drive yourself we can only care for you in a [facility] facility. Our providers are not setup at the other healthcare locations!    PLEASE CALL OUR OFFICE DURING NORMAL BUSINESS HOURS  Monday through Friday 8 am to 5 pm  AFTER HOURS the physician on-call cannot help with scheduling, rescheduling, procedure instruction questions or any type of medication need or issue.  [facility] P:[phone] - [facility] P:[phone] - [facility] P:[phone]      If you receive a survey:  We would appreciate you taking the time to share your experience concerning your provider visit in the office.    These surveys are confidential!  We are eager to improve and are counting on you to share your feedback so we can ensure you get the best care possible.  CORONARY ARTERY DISEASE:None known  8/2020   Conclusion:Fair exercise tolerance. 5 METs work load.   Excessive HR response to exertion suggests A-fib HR is not well controlled.   Physiological BP response to exercise.   ETT non diagnostic due to baseline abnormalities.  HTN:well controlled on current medical regimen, see list above.              - changes in  treatment:   no, on Atenolol     CARDIOMYOPATHY: None known   CONGESTIVE HEART FAILURE: NO KNOWN HISTORY.        VHD: Moderate MR / AI  ECHO 8/2020   Left ventricular function is normal, EF is estimated at 55-60%.   Diastolic dysfunction could not be evaluated due to arrhythmia.   No regional wall motion abnormalities were detected.   Bi atrial enlargemnt.   Mitral annular

## 2025-03-25 NOTE — PROGRESS NOTES
CLINICAL STAFF DOCUMENTATION    Dr. Kyle Pham  1940  [id number]    Pt declined EKG due to insurance issues.    Have you had any Chest Pain recently? - No      Have you had any Shortness of Breath - No    Have you had any dizziness - No    Have you had any palpitations recently? - No    Do you have any edema - swelling in No      When did you have your last labs drawn 2022  Do we have the labs in their chart Yes    If we do not have these labs, you are retrieve these labs for the provider!    Do you have a surgery or procedure scheduled in the near future - No        Check medication list thoroughly!!! AND RECONCILE OUTSIDE MEDICATIONS  If dose has changed change the entire order not just the MG  BE SURE TO ASK PATIENT IF THEY NEED MEDICATION REFILLS  Verify Pharmacy and update if incorrect    Add to every patient's \"wrap up\" the following dot phrase AFTERVISITCARDIOHEARTHOUSE and ensure we explain this to our patients

## 2025-03-25 NOTE — PROGRESS NOTES
OFFICE PROGRESS NOTES      Vilma is a 84 y.o. female who has    CHIEF COMPLAINT AS FOLLOWS:  CHEST PAIN:Patient denies any C/O chest pains at this time.      SOB: No C/O SOB at this time.               LEG EDEMA: No leg edema but Leg pains & Varicose veins.   PALPITATIONS: Denies any C/O Palpitations   DIZZINESS: No C/O Dizziness   SYNCOPE: None   OTHER/ ADDITIONAL COMPLAINTS:                                     HPI: Patient is here for F/U on her Arrhythmia, HTN & Dyslipidemia problems.     Arrhythmia: Patient has known  A-fib.  HTN: Patient has known essential HTN. Has been treated with guideline recommended medical / physical/ diet therapy as stated below.  Dyslipidemia: Patient has known mixed dyslipidemia. Has been treated with guideline recommended medical / physical/ diet therapy as stated below.                Current Outpatient Medications   Medication Sig Dispense Refill    atenolol (TENORMIN) 50 MG tablet Take 1 tablet by mouth 2 times daily 180 tablet 3    atenolol (TENORMIN) 25 MG tablet Take 1 tablet by mouth 2 times daily 180 tablet 3    apixaban (ELIQUIS) 5 MG TABS tablet Take 1 tablet by mouth 2 times daily 200 tablet 3    lovastatin (MEVACOR) 20 MG tablet Take 1 tablet by mouth daily 90 tablet 3     No current facility-administered medications for this visit.     Allergies: Penicillins  Review of Systems:    Constitutional: Negative for diaphoresis and fatigue  Respiratory: Negative for shortness of breath  Cardiovascular: Negative for chest pain, dyspnea on exertion, claudication, edema, irregular heartbeat, murmur, palpitations or shortness of breath  Musculoskeletal: Negative for muscle pain, muscular weakness, negative for pain in arm and leg or swelling in foot and leg    Objective:  /66 (BP Site: Left Upper Arm, Patient Position: Sitting, BP Cuff Size: Large Adult)   Pulse 80   Ht 1.626 m (5' 4\")   Wt 77.6 kg (171 lb)   BMI 29.35 kg/m²   Wt Readings from Last 3 Encounters: